# Patient Record
Sex: MALE | Race: WHITE
[De-identification: names, ages, dates, MRNs, and addresses within clinical notes are randomized per-mention and may not be internally consistent; named-entity substitution may affect disease eponyms.]

---

## 2018-02-26 ENCOUNTER — HOSPITAL ENCOUNTER (EMERGENCY)
Dept: HOSPITAL 14 - H.ER | Age: 38
Discharge: HOME | End: 2018-02-26
Payer: COMMERCIAL

## 2018-02-26 VITALS
TEMPERATURE: 98.6 F | OXYGEN SATURATION: 99 % | HEART RATE: 101 BPM | DIASTOLIC BLOOD PRESSURE: 80 MMHG | RESPIRATION RATE: 18 BRPM | SYSTOLIC BLOOD PRESSURE: 148 MMHG

## 2018-02-26 DIAGNOSIS — K04.7: Primary | ICD-10-CM

## 2018-02-26 NOTE — ED PDOC
HPI: Dental Pain/Injury


Time Seen by Provider: 02/26/18 22:09


Chief Complaint (Nursing): Abnormal Skin Integrity


History Per: Patient


Onset/Duration Of Symptoms: Days (1)


Additional Complaint(s): 





37 yo M presents with 1 day h/o L sided facial swelling, reports that a piece 

of his L 3rd upper molar fell off 5 days ago, prior to the tooh falling off he 

did have pain, reports no dental pain now. Took motrin earlier today with 

little relief. Denies any fever, chills, headache, URI, sore throat, rash. Has 

no other complaints. 





Past Medical History


Vital Signs: 


 Last Vital Signs











Temp  98.6 F   02/26/18 22:02


 


Pulse  101 H  02/26/18 22:02


 


Resp  18   02/26/18 22:02


 


BP  148/80   02/26/18 22:02


 


Pulse Ox  99   02/26/18 22:02














- Medical History


PMH: No Chronic Diseases





- Surgical History


Surgical History: Appendectomy





- Family History


Family History: States: Unknown Family Hx





- Home Medications


Home Medications: 


 Ambulatory Orders











 Medication  Instructions  Recorded


 


Cephalexin [cephalexin] 500 mg PO BID #20 cap 01/11/16


 


Amoxicillin 500 mg PO TID #30 tablet 02/26/18


 


Naproxen 500 mg PO BID PRN #20 tablet 02/26/18














- Allergies


Allergies/Adverse Reactions: 


 Allergies











Allergy/AdvReac Type Severity Reaction Status Date / Time


 


No Known Allergies Allergy   Verified 01/11/16 21:23














Review of Systems


Constitutional: Negative for: Fever, Chills


ENT: Positive for: Other (+facial swelling).  Negative for: Ear Pain, Nose 

Discharge, Mouth Pain, Throat Pain, Throat Swelling


Respiratory: Negative for: Cough, Shortness of Breath


Gastrointestinal: Negative for: Nausea, Vomiting


Skin: Negative for: Rash





Physical Exam





- Physical Exam


Appears: Positive for: Well, No Acute Distress


Head Exam: Positive for: ATRAUMATIC (+mild edema to the L cheek)


Skin: Positive for: Normal Color, Warm, Dry


Eye Exam: Positive for: Normal appearance, EOMI, PERRL


ENT: Positive for: Pharynx Is (wnl, no erythema, airway is patent), TM Is/Are (

wnl, no erythema), Other (mucus membranes moist, +fracture of the L upper 3rd 

molar with no surrounding edema or erythema)


Neck: Positive for: Normal, Supple


Neurologic/Psych: Positive for: Alert, CNs II-XII





- ECG


O2 Sat by Pulse Oximetry: 99





Medical Decision Making


Medical Decision Making: 





Medicated with amoxicillin 500 mg po and naprosyn 500 mg po.





Advised to follow up with a dentist in 1-2 days without fail. Advised to take 

medication as prescribed. Return to the emergency room at any time for any new 

or worsening symptoms.


Patient states he fully agrees with and understands discharge instructions. 

States that heagrees with the plan and disposition. Verbalized and repeated 

discharge instructions and plan. I have given the patient opportunity to ask 

any additional questions.





Disposition





- Clinical Impression


Clinical Impression: 


 Dental abscess








- Patient ED Disposition


Is Patient to be Admitted: No


Counseled Patient/Family Regarding: Diagnosis, Need For Followup, Rx Given





- Disposition


Referrals: 


Santy Seaman DMD [Staff Provider] - 


Erik Edwards DMD [Staff Provider] - 


[facility] [Outside]


Disposition: Routine/Home


Disposition Time: 22:30


Condition: STABLE


Additional Instructions: 


Thank you for letting us take care of you today. You were treated for dental 

abscess. The emergency medical care you received today was directed at your 

acute symptoms. If you were prescribed any medication, please fill it and take 

as directed. It may take several days for your symptoms to resolve. Return to 

the Emergency Department if your symptoms worsen, do not improve, or if you 

have any other problems.





Please call one of the physicians/clinics you have been referred to that are 

listed on the Patient Visit Information form that is included in your discharge 

packet. Bring any paperwork you were given at discharge with you along with any 

medications you are taking to your follow up visit. Our treatment cannot 

replace ongoing medical care by a primary care provider (PCP) outside of the 

emergency department.





Thank you for allowing the [facility] team to be part of your care today


Prescriptions: 


Amoxicillin 500 mg PO TID #30 tablet


Naproxen 500 mg PO BID PRN #20 tablet


 PRN Reason: Pain, Moderate (4-7)


Instructions:  Dental Pain (DC), Tooth Abscess (DC)


Forms:  [facility] (English), [facility] ED School/Work Excuse





- PA / NP / Resident Statement


MD/DO has reviewed & agrees with the documentation as recorded.